# Patient Record
Sex: MALE | Race: WHITE | NOT HISPANIC OR LATINO | Employment: FULL TIME | URBAN - METROPOLITAN AREA
[De-identification: names, ages, dates, MRNs, and addresses within clinical notes are randomized per-mention and may not be internally consistent; named-entity substitution may affect disease eponyms.]

---

## 2018-01-18 ENCOUNTER — HOSPITAL ENCOUNTER (EMERGENCY)
Facility: OTHER | Age: 28
Discharge: HOME | End: 2018-01-18
Attending: EMERGENCY MEDICINE
Payer: MEDICAID

## 2018-01-18 VITALS
TEMPERATURE: 99 F | HEIGHT: 67 IN | OXYGEN SATURATION: 97 % | WEIGHT: 180 LBS | SYSTOLIC BLOOD PRESSURE: 152 MMHG | BODY MASS INDEX: 28.25 KG/M2 | RESPIRATION RATE: 19 BRPM | HEART RATE: 89 BPM | DIASTOLIC BLOOD PRESSURE: 72 MMHG

## 2018-01-18 DIAGNOSIS — B34.9 VIRAL SYNDROME: Primary | ICD-10-CM

## 2018-01-18 LAB
FLUAV AG SPEC QL IA: NEGATIVE
FLUBV AG SPEC QL IA: NEGATIVE
SPECIMEN SOURCE: NORMAL

## 2018-01-18 PROCEDURE — 99283 EMERGENCY DEPT VISIT LOW MDM: CPT

## 2018-01-18 PROCEDURE — 87400 INFLUENZA A/B EACH AG IA: CPT

## 2018-01-19 NOTE — ED PROVIDER NOTES
"Encounter Date: 1/18/2018    SCRIBE #1 NOTE: I, Alejapratima Rivers, am scribing for, and in the presence of, Dr. Chino.       History     Chief Complaint   Patient presents with    Generalized Body Aches     Pt c/o "not feeling well" since Tuesday, sore throat, headache, fever chills, and some diarrhea. Pt taking OTC meds with no relief      Time seen by provider: 9:29 PM    This is a 27 y.o. male who presents with complaint of multiple flu-like symptoms which started two days ago. The patient reports generalized body aches, headache, subjective fever, chills, decreased appetite, and diarrhea. Symptoms have been unchanged by OTC cold medications. He denies nausea, vomiting, abdominal pain, constipation, ear pain, sore throat, chest pain, shortness of breath, light-headedness, dizziness, or visual disturbance. Patient reports mother as recent sick contact with flu. He denies having any pertinent medical problems. The patient has no additional complaints at this time.      The history is provided by the patient.     Review of patient's allergies indicates:  No Known Allergies  History reviewed. No pertinent past medical history.  History reviewed. No pertinent surgical history.  History reviewed. No pertinent family history.  Social History   Substance Use Topics    Smoking status: Never Smoker    Smokeless tobacco: Never Used    Alcohol use Yes     Review of Systems   Constitutional: Positive for appetite change (decreased), chills and fever (subjective).   HENT: Negative for ear pain and sore throat.    Eyes: Negative for visual disturbance.   Respiratory: Negative for shortness of breath.    Cardiovascular: Negative for chest pain.   Gastrointestinal: Positive for diarrhea. Negative for abdominal pain, constipation, nausea and vomiting.   Musculoskeletal: Positive for myalgias.   Neurological: Positive for headaches. Negative for dizziness and light-headedness.       Physical Exam     Initial Vitals [01/18/18 2059] "   BP Pulse Resp Temp SpO2   (!) 152/72 89 19 98.8 °F (37.1 °C) 97 %      MAP       98.67         Physical Exam    Nursing note and vitals reviewed.  Constitutional: He appears well-developed and well-nourished. He is not diaphoretic. No distress.   HENT:   Head: Normocephalic and atraumatic.   Right Ear: External ear normal.   Left Ear: External ear normal.   Bilateral TMs with no erythema or effusion. Bilateral tonsils have edema with no exudates. No uvula deviation.   Eyes: EOM are normal. Pupils are equal, round, and reactive to light. No scleral icterus.   Neck: Normal range of motion. Neck supple.   No cervical lymphadenopathy.   Cardiovascular: Normal rate, regular rhythm and normal heart sounds. Exam reveals no gallop and no friction rub.    No murmur heard.  Pulmonary/Chest: Breath sounds normal. No respiratory distress. He has no wheezes. He has no rhonchi. He has no rales.   Abdominal: Soft. Bowel sounds are normal. He exhibits no distension. There is no tenderness. There is no rebound and no guarding.   Musculoskeletal: Normal range of motion. He exhibits no edema or tenderness.   Lymphadenopathy:     He has no cervical adenopathy.   Neurological: He is alert and oriented to person, place, and time.   Skin: Skin is warm and dry. Capillary refill takes less than 2 seconds. No rash and no abscess noted. No erythema. No pallor.   Psychiatric: He has a normal mood and affect. His behavior is normal. Judgment and thought content normal.         ED Course   Procedures  Labs Reviewed   INFLUENZA A AND B ANTIGEN             Medical Decision Making:   Clinical Tests:   Lab Tests: Ordered and Reviewed  ED Management:  Well-appearing patient presents with 2 days of a constellation of symptoms consistent with viral illness.  Influenza is negative.  No concerning vital signs.  Discussed symptomatic treatment.  Encouraged to stay hydrated.  Follow-up primary care return here if worse.    I did have an extensive talk  regarding signs to return for and need for follow up. Patient expressed understanding and will monitor symptoms closely and follow-up as needed.    LYLE Chino M.D.  01/18/2018  11:31 PM              Scribe Attestation:   Scribe #1: I performed the above scribed service and the documentation accurately describes the services I performed. I attest to the accuracy of the note.    Attending Attestation:           Physician Attestation for Scribe:  Physician Attestation Statement for Scribe #1: I, Dr. Chino, reviewed documentation, as scribed by Aleja Rivers in my presence, and it is both accurate and complete.                 ED Course      Clinical Impression:     1. Viral syndrome                               Mark Chino MD  01/18/18 7175

## 2023-06-21 ENCOUNTER — HOSPITAL ENCOUNTER (INPATIENT)
Facility: HOSPITAL | Age: 33
LOS: 1 days | Discharge: HOME | DRG: 439 | End: 2023-06-22
Attending: EMERGENCY MEDICINE | Admitting: FAMILY MEDICINE
Payer: MEDICAID

## 2023-06-21 DIAGNOSIS — K85.90 PANCREATITIS: ICD-10-CM

## 2023-06-21 DIAGNOSIS — R07.9 CHEST PAIN: ICD-10-CM

## 2023-06-21 DIAGNOSIS — R10.30 GROIN PAIN: ICD-10-CM

## 2023-06-21 DIAGNOSIS — N20.1 RIGHT URETERAL STONE: ICD-10-CM

## 2023-06-21 DIAGNOSIS — K85.90 ACUTE PANCREATITIS, UNSPECIFIED COMPLICATION STATUS, UNSPECIFIED PANCREATITIS TYPE: Primary | ICD-10-CM

## 2023-06-21 PROBLEM — N43.3 HYDROCELE IN ADULT: Status: ACTIVE | Noted: 2023-06-21

## 2023-06-21 PROBLEM — N20.0 KIDNEY STONE: Status: ACTIVE | Noted: 2023-06-21

## 2023-06-21 PROBLEM — R74.8 ELEVATED LIPASE: Status: ACTIVE | Noted: 2023-06-21

## 2023-06-21 LAB
ALBUMIN SERPL BCP-MCNC: 4.4 G/DL (ref 3.5–5.2)
ALP SERPL-CCNC: 60 U/L (ref 55–135)
ALT SERPL W/O P-5'-P-CCNC: 24 U/L (ref 10–44)
AMPHET+METHAMPHET UR QL: NEGATIVE
ANION GAP SERPL CALC-SCNC: 7 MMOL/L (ref 8–16)
AST SERPL-CCNC: 23 U/L (ref 10–40)
BACTERIA #/AREA URNS HPF: NEGATIVE /HPF
BARBITURATES UR QL SCN>200 NG/ML: NEGATIVE
BASOPHILS # BLD AUTO: 0.05 K/UL (ref 0–0.2)
BASOPHILS NFR BLD: 0.7 % (ref 0–1.9)
BENZODIAZ UR QL SCN>200 NG/ML: NEGATIVE
BILIRUB SERPL-MCNC: 0.7 MG/DL (ref 0.1–1)
BILIRUB UR QL STRIP: NEGATIVE
BUN SERPL-MCNC: 10 MG/DL (ref 6–20)
BZE UR QL SCN: NEGATIVE
CALCIUM SERPL-MCNC: 9.2 MG/DL (ref 8.7–10.5)
CANNABINOIDS UR QL SCN: ABNORMAL
CHLORIDE SERPL-SCNC: 106 MMOL/L (ref 95–110)
CK SERPL-CCNC: 367 U/L (ref 20–200)
CLARITY UR: CLEAR
CO2 SERPL-SCNC: 25 MMOL/L (ref 23–29)
COLOR UR: YELLOW
CREAT SERPL-MCNC: 0.9 MG/DL (ref 0.5–1.4)
CREAT UR-MCNC: 162 MG/DL (ref 23–375)
DIFFERENTIAL METHOD: ABNORMAL
EOSINOPHIL # BLD AUTO: 0 K/UL (ref 0–0.5)
EOSINOPHIL NFR BLD: 0.3 % (ref 0–8)
ERYTHROCYTE [DISTWIDTH] IN BLOOD BY AUTOMATED COUNT: 13.2 % (ref 11.5–14.5)
EST. GFR  (NO RACE VARIABLE): >60 ML/MIN/1.73 M^2
GLUCOSE SERPL-MCNC: 95 MG/DL (ref 70–110)
GLUCOSE UR QL STRIP: NEGATIVE
HCT VFR BLD AUTO: 43.7 % (ref 40–54)
HGB BLD-MCNC: 14.3 G/DL (ref 14–18)
HGB UR QL STRIP: ABNORMAL
HYALINE CASTS #/AREA URNS LPF: 1 /LPF
IMM GRANULOCYTES # BLD AUTO: 0.02 K/UL (ref 0–0.04)
IMM GRANULOCYTES NFR BLD AUTO: 0.3 % (ref 0–0.5)
KETONES UR QL STRIP: NEGATIVE
LEUKOCYTE ESTERASE UR QL STRIP: NEGATIVE
LIPASE SERPL-CCNC: 151 U/L (ref 4–60)
LIPASE SERPL-CCNC: 360 U/L (ref 4–60)
LYMPHOCYTES # BLD AUTO: 1 K/UL (ref 1–4.8)
LYMPHOCYTES NFR BLD: 14 % (ref 18–48)
MAGNESIUM SERPL-MCNC: 1.8 MG/DL (ref 1.6–2.6)
MCH RBC QN AUTO: 28.5 PG (ref 27–31)
MCHC RBC AUTO-ENTMCNC: 32.7 G/DL (ref 32–36)
MCV RBC AUTO: 87 FL (ref 82–98)
MICROSCOPIC COMMENT: ABNORMAL
MONOCYTES # BLD AUTO: 0.5 K/UL (ref 0.3–1)
MONOCYTES NFR BLD: 7 % (ref 4–15)
NEUTROPHILS # BLD AUTO: 5.4 K/UL (ref 1.8–7.7)
NEUTROPHILS NFR BLD: 77.7 % (ref 38–73)
NITRITE UR QL STRIP: NEGATIVE
NRBC BLD-RTO: 0 /100 WBC
OPIATES UR QL SCN: ABNORMAL
PCP UR QL SCN>25 NG/ML: NEGATIVE
PH UR STRIP: 7 [PH] (ref 5–8)
PLATELET # BLD AUTO: 195 K/UL (ref 150–450)
PMV BLD AUTO: 11.2 FL (ref 9.2–12.9)
POTASSIUM SERPL-SCNC: 3.7 MMOL/L (ref 3.5–5.1)
PROT SERPL-MCNC: 7.7 G/DL (ref 6–8.4)
PROT UR QL STRIP: NEGATIVE
RBC # BLD AUTO: 5.01 M/UL (ref 4.6–6.2)
RBC #/AREA URNS HPF: 16 /HPF (ref 0–4)
SODIUM SERPL-SCNC: 138 MMOL/L (ref 136–145)
SP GR UR STRIP: 1.01 (ref 1–1.03)
SQUAMOUS #/AREA URNS HPF: 1 /HPF
TOXICOLOGY INFORMATION: ABNORMAL
TSH SERPL DL<=0.005 MIU/L-ACNC: 0.84 UIU/ML (ref 0.34–5.6)
URN SPEC COLLECT METH UR: ABNORMAL
UROBILINOGEN UR STRIP-ACNC: NEGATIVE EU/DL
WBC # BLD AUTO: 6.99 K/UL (ref 3.9–12.7)
WBC #/AREA URNS HPF: 0 /HPF (ref 0–5)

## 2023-06-21 PROCEDURE — 51798 US URINE CAPACITY MEASURE: CPT

## 2023-06-21 PROCEDURE — 82550 ASSAY OF CK (CPK): CPT | Performed by: EMERGENCY MEDICINE

## 2023-06-21 PROCEDURE — 25000003 PHARM REV CODE 250: Performed by: NURSE PRACTITIONER

## 2023-06-21 PROCEDURE — 96375 TX/PRO/DX INJ NEW DRUG ADDON: CPT

## 2023-06-21 PROCEDURE — 96374 THER/PROPH/DIAG INJ IV PUSH: CPT

## 2023-06-21 PROCEDURE — 81001 URINALYSIS AUTO W/SCOPE: CPT | Performed by: EMERGENCY MEDICINE

## 2023-06-21 PROCEDURE — C9113 INJ PANTOPRAZOLE SODIUM, VIA: HCPCS | Performed by: EMERGENCY MEDICINE

## 2023-06-21 PROCEDURE — 99285 EMERGENCY DEPT VISIT HI MDM: CPT | Mod: 25

## 2023-06-21 PROCEDURE — 84443 ASSAY THYROID STIM HORMONE: CPT | Performed by: EMERGENCY MEDICINE

## 2023-06-21 PROCEDURE — 12000002 HC ACUTE/MED SURGE SEMI-PRIVATE ROOM

## 2023-06-21 PROCEDURE — 80053 COMPREHEN METABOLIC PANEL: CPT | Performed by: EMERGENCY MEDICINE

## 2023-06-21 PROCEDURE — 25000003 PHARM REV CODE 250: Performed by: EMERGENCY MEDICINE

## 2023-06-21 PROCEDURE — 83690 ASSAY OF LIPASE: CPT | Mod: 91 | Performed by: EMERGENCY MEDICINE

## 2023-06-21 PROCEDURE — 96361 HYDRATE IV INFUSION ADD-ON: CPT

## 2023-06-21 PROCEDURE — 83735 ASSAY OF MAGNESIUM: CPT | Performed by: EMERGENCY MEDICINE

## 2023-06-21 PROCEDURE — 85025 COMPLETE CBC W/AUTO DIFF WBC: CPT | Performed by: EMERGENCY MEDICINE

## 2023-06-21 PROCEDURE — 80307 DRUG TEST PRSMV CHEM ANLYZR: CPT | Performed by: EMERGENCY MEDICINE

## 2023-06-21 PROCEDURE — 63600175 PHARM REV CODE 636 W HCPCS: Performed by: EMERGENCY MEDICINE

## 2023-06-21 RX ORDER — PANTOPRAZOLE SODIUM 40 MG/10ML
40 INJECTION, POWDER, LYOPHILIZED, FOR SOLUTION INTRAVENOUS DAILY
Status: DISCONTINUED | OUTPATIENT
Start: 2023-06-22 | End: 2023-06-21

## 2023-06-21 RX ORDER — PROCHLORPERAZINE EDISYLATE 5 MG/ML
5 INJECTION INTRAMUSCULAR; INTRAVENOUS EVERY 6 HOURS PRN
Status: DISCONTINUED | OUTPATIENT
Start: 2023-06-21 | End: 2023-06-22 | Stop reason: HOSPADM

## 2023-06-21 RX ORDER — SIMETHICONE 80 MG
1 TABLET,CHEWABLE ORAL 4 TIMES DAILY PRN
Status: DISCONTINUED | OUTPATIENT
Start: 2023-06-21 | End: 2023-06-22 | Stop reason: HOSPADM

## 2023-06-21 RX ORDER — TALC
6 POWDER (GRAM) TOPICAL NIGHTLY PRN
Status: DISCONTINUED | OUTPATIENT
Start: 2023-06-21 | End: 2023-06-22 | Stop reason: HOSPADM

## 2023-06-21 RX ORDER — POLYETHYLENE GLYCOL 3350 17 G/17G
17 POWDER, FOR SOLUTION ORAL 2 TIMES DAILY PRN
Status: DISCONTINUED | OUTPATIENT
Start: 2023-06-21 | End: 2023-06-22 | Stop reason: HOSPADM

## 2023-06-21 RX ORDER — MAGNESIUM SULFATE HEPTAHYDRATE 40 MG/ML
2 INJECTION, SOLUTION INTRAVENOUS
Status: DISCONTINUED | OUTPATIENT
Start: 2023-06-21 | End: 2023-06-22 | Stop reason: HOSPADM

## 2023-06-21 RX ORDER — NALOXONE HCL 0.4 MG/ML
0.02 VIAL (ML) INJECTION
Status: DISCONTINUED | OUTPATIENT
Start: 2023-06-21 | End: 2023-06-22 | Stop reason: HOSPADM

## 2023-06-21 RX ORDER — MORPHINE SULFATE 2 MG/ML
2 INJECTION, SOLUTION INTRAMUSCULAR; INTRAVENOUS
Status: COMPLETED | OUTPATIENT
Start: 2023-06-21 | End: 2023-06-21

## 2023-06-21 RX ORDER — KETOROLAC TROMETHAMINE 30 MG/ML
15 INJECTION, SOLUTION INTRAMUSCULAR; INTRAVENOUS
Status: COMPLETED | OUTPATIENT
Start: 2023-06-21 | End: 2023-06-21

## 2023-06-21 RX ORDER — ACETAMINOPHEN 325 MG/1
650 TABLET ORAL EVERY 4 HOURS PRN
Status: DISCONTINUED | OUTPATIENT
Start: 2023-06-21 | End: 2023-06-22 | Stop reason: HOSPADM

## 2023-06-21 RX ORDER — SODIUM CHLORIDE 0.9 % (FLUSH) 0.9 %
10 SYRINGE (ML) INJECTION
Status: DISCONTINUED | OUTPATIENT
Start: 2023-06-21 | End: 2023-06-22 | Stop reason: HOSPADM

## 2023-06-21 RX ORDER — PANTOPRAZOLE SODIUM 40 MG/10ML
40 INJECTION, POWDER, LYOPHILIZED, FOR SOLUTION INTRAVENOUS ONCE
Status: COMPLETED | OUTPATIENT
Start: 2023-06-21 | End: 2023-06-21

## 2023-06-21 RX ORDER — ONDANSETRON 2 MG/ML
4 INJECTION INTRAMUSCULAR; INTRAVENOUS EVERY 6 HOURS PRN
Status: DISCONTINUED | OUTPATIENT
Start: 2023-06-22 | End: 2023-06-22 | Stop reason: HOSPADM

## 2023-06-21 RX ORDER — MORPHINE SULFATE 4 MG/ML
4 INJECTION, SOLUTION INTRAMUSCULAR; INTRAVENOUS EVERY 4 HOURS PRN
Status: DISCONTINUED | OUTPATIENT
Start: 2023-06-22 | End: 2023-06-22 | Stop reason: HOSPADM

## 2023-06-21 RX ORDER — MORPHINE SULFATE 2 MG/ML
2 INJECTION, SOLUTION INTRAMUSCULAR; INTRAVENOUS EVERY 4 HOURS PRN
Status: DISCONTINUED | OUTPATIENT
Start: 2023-06-22 | End: 2023-06-22 | Stop reason: HOSPADM

## 2023-06-21 RX ORDER — TAMSULOSIN HYDROCHLORIDE 0.4 MG/1
0.4 CAPSULE ORAL DAILY
Status: DISCONTINUED | OUTPATIENT
Start: 2023-06-22 | End: 2023-06-22 | Stop reason: HOSPADM

## 2023-06-21 RX ORDER — SODIUM CHLORIDE 9 MG/ML
INJECTION, SOLUTION INTRAVENOUS CONTINUOUS
Status: DISCONTINUED | OUTPATIENT
Start: 2023-06-21 | End: 2023-06-22 | Stop reason: HOSPADM

## 2023-06-21 RX ORDER — ONDANSETRON 2 MG/ML
4 INJECTION INTRAMUSCULAR; INTRAVENOUS
Status: COMPLETED | OUTPATIENT
Start: 2023-06-21 | End: 2023-06-21

## 2023-06-21 RX ORDER — MAGNESIUM SULFATE HEPTAHYDRATE 40 MG/ML
4 INJECTION, SOLUTION INTRAVENOUS
Status: DISCONTINUED | OUTPATIENT
Start: 2023-06-21 | End: 2023-06-22 | Stop reason: HOSPADM

## 2023-06-21 RX ORDER — TAMSULOSIN HYDROCHLORIDE 0.4 MG/1
0.4 CAPSULE ORAL
Status: COMPLETED | OUTPATIENT
Start: 2023-06-21 | End: 2023-06-21

## 2023-06-21 RX ADMIN — SODIUM CHLORIDE: 0.9 INJECTION, SOLUTION INTRAVENOUS at 11:06

## 2023-06-21 RX ADMIN — SODIUM CHLORIDE, SODIUM LACTATE, POTASSIUM CHLORIDE, AND CALCIUM CHLORIDE 1000 ML: .6; .31; .03; .02 INJECTION, SOLUTION INTRAVENOUS at 08:06

## 2023-06-21 RX ADMIN — KETOROLAC TROMETHAMINE 15 MG: 30 INJECTION, SOLUTION INTRAMUSCULAR; INTRAVENOUS at 06:06

## 2023-06-21 RX ADMIN — SODIUM CHLORIDE 1000 ML: 0.9 INJECTION, SOLUTION INTRAVENOUS at 06:06

## 2023-06-21 RX ADMIN — TAMSULOSIN HYDROCHLORIDE 0.4 MG: 0.4 CAPSULE ORAL at 06:06

## 2023-06-21 RX ADMIN — MORPHINE SULFATE 2 MG: 2 INJECTION, SOLUTION INTRAMUSCULAR; INTRAVENOUS at 09:06

## 2023-06-21 RX ADMIN — PANTOPRAZOLE SODIUM 40 MG: 40 INJECTION, POWDER, LYOPHILIZED, FOR SOLUTION INTRAVENOUS at 08:06

## 2023-06-21 RX ADMIN — ONDANSETRON 4 MG: 2 INJECTION INTRAMUSCULAR; INTRAVENOUS at 09:06

## 2023-06-21 NOTE — FIRST PROVIDER EVALUATION
"Medical screening examination initiated.  I have conducted a focused provider triage encounter, findings are as follows:    Brief history of present illness:  Patient to the ED via EMS.  Complaining of right flank pain.  Patient states it wraps around his right lower quadrant into his right groin.  Denies fever nausea vomiting or diarrhea.  Patient's pain is controlled.      Vitals:    06/21/23 1327   BP: (!) 140/65   BP Location: Right arm   Patient Position: Sitting   Pulse: 60   Resp: 16   Temp: 98 °F (36.7 °C)   TempSrc: Oral   SpO2: 98%   Weight: 77.1 kg (170 lb)   Height: 5' 7" (1.702 m)       Pertinent physical exam:  Right CVA tenderness.  Negative swelling to the groin region.    Brief workup plan:  Labs.    Preliminary workup initiated; this workup will be continued and followed by the physician or advanced practice provider that is assigned to the patient when roomed.  "

## 2023-06-21 NOTE — ED PROVIDER NOTES
"Encounter Date: 6/21/2023       History     Chief Complaint   Patient presents with    Abdominal Pain     Right lower abdominal pain radiating to his back and down into his groin. Sudden onset no history. EMS gave 4mg of zofran and 100mcg of fentynal.      This is a 33-year-old male presenting with a sudden onset of right flank pain radiating around his right lower abdomen right groin area.  Denies any testicular pain to states the pain radiates into the "groin" region.  He is had associated urinary urgency and frequency since the pain started.  Denies any history of similar symptoms.  Denies any pain or discomfort prior to the onset of the symptoms today.  No history of kidney stones.  Denies any penile discharge, fever the patient was given fentanyl and Zofran prior to ED arrival by EMS with significant improvement in symptoms.  Denies any other problems or complaints.      Review of patient's allergies indicates:  No Known Allergies  No past medical history on file.  No past surgical history on file.  No family history on file.  Social History     Tobacco Use    Smoking status: Never    Smokeless tobacco: Never   Substance Use Topics    Alcohol use: Yes    Drug use: No     Review of Systems   Constitutional: Negative.  Negative for activity change, appetite change, chills, diaphoresis, fatigue, fever and unexpected weight change.   HENT: Negative.  Negative for congestion, dental problem, ear pain, nosebleeds, rhinorrhea, sinus pain, sore throat, trouble swallowing and voice change.    Eyes: Negative.  Negative for pain and visual disturbance.   Respiratory: Negative.  Negative for cough, chest tightness, shortness of breath and wheezing.    Cardiovascular: Negative.  Negative for chest pain, palpitations and leg swelling.   Gastrointestinal: Negative.  Negative for abdominal pain, blood in stool, constipation, diarrhea, nausea and vomiting.   Endocrine: Negative.    Genitourinary:  Positive for flank pain and " frequency. Negative for decreased urine volume, difficulty urinating, dysuria, genital sores, hematuria, penile discharge, penile pain, penile swelling, scrotal swelling, testicular pain and urgency.   Musculoskeletal:  Negative for arthralgias, back pain, gait problem, joint swelling, myalgias, neck pain and neck stiffness.   Skin: Negative.  Negative for pallor and rash.   Neurological: Negative.  Negative for dizziness, seizures, syncope, facial asymmetry, speech difficulty, weakness, light-headedness, numbness and headaches.   Hematological: Negative.  Does not bruise/bleed easily.   Psychiatric/Behavioral: Negative.  Negative for confusion.    All other systems reviewed and are negative.    Physical Exam     Initial Vitals [06/21/23 1327]   BP Pulse Resp Temp SpO2   (!) 140/65 60 16 98 °F (36.7 °C) 98 %      MAP       --         Physical Exam    Nursing note and vitals reviewed.  Constitutional: He appears well-nourished. He is active and cooperative.  Non-toxic appearance. He does not have a sickly appearance. He does not appear ill. No distress.   HENT:   Head: Normocephalic and atraumatic.   Right Ear: Tympanic membrane normal.   Left Ear: Tympanic membrane normal.   Nose: Nose normal. No mucosal edema or rhinorrhea.   Mouth/Throat: Uvula is midline, oropharynx is clear and moist and mucous membranes are normal. No oral lesions. No trismus in the jaw. No uvula swelling. No oropharyngeal exudate, posterior oropharyngeal edema, posterior oropharyngeal erythema or tonsillar abscesses.   Eyes: Conjunctivae, EOM and lids are normal. Pupils are equal, round, and reactive to light. Right eye exhibits no discharge. Left eye exhibits no discharge. No scleral icterus.   Neck: Trachea normal and phonation normal. Neck supple. No thyromegaly present. No stridor present. No tracheal deviation present. No JVD present.   Normal range of motion.   Full passive range of motion without pain.     Cardiovascular:  Normal rate,  regular rhythm, normal heart sounds, intact distal pulses and normal pulses.     Exam reveals no gallop, no distant heart sounds, no friction rub and no decreased pulses.       No murmur heard.  Pulmonary/Chest: Effort normal and breath sounds normal. No stridor. No respiratory distress. He has no decreased breath sounds. He has no wheezes. He has no rhonchi. He has no rales.   Abdominal: Abdomen is soft. Bowel sounds are normal. He exhibits no distension, no pulsatile midline mass and no mass. There is no abdominal tenderness. No hernia. There is no rebound and no guarding.   Musculoskeletal:         General: No tenderness or edema. Normal range of motion.      Right hand: Normal. No tenderness or bony tenderness. Normal range of motion. Normal strength. Normal sensation. Normal capillary refill. Normal pulse.      Left hand: Normal. No tenderness or bony tenderness. Normal range of motion. Normal strength. Normal sensation. Normal capillary refill. Normal pulse.      Cervical back: Normal, full passive range of motion without pain, normal range of motion and neck supple. No swelling, edema, erythema, rigidity, tenderness or bony tenderness. No pain with movement, spinous process tenderness or muscular tenderness. Normal range of motion and normal range of motion. Normal.      Thoracic back: Normal. No swelling, tenderness or bony tenderness. Normal range of motion.      Lumbar back: Normal. No swelling, edema, tenderness or bony tenderness. Normal range of motion.      Right foot: Normal. Normal range of motion and normal capillary refill. No swelling, tenderness or bony tenderness. Normal pulse.      Left foot: Normal. Normal range of motion and normal capillary refill. No swelling, tenderness or bony tenderness. Normal pulse.      Comments: Pulses are 2+ throughout, cap refill is less than 2 sec throughout, no edema noted, extremities are nontender throughout with full range of motion     Lymphadenopathy:     He  has no cervical adenopathy.   Neurological: He is alert and oriented to person, place, and time. He has normal strength. No cranial nerve deficit or sensory deficit. Coordination and gait normal. GCS score is 15. GCS eye subscore is 4. GCS verbal subscore is 5. GCS motor subscore is 6.   No focal deficits   Skin: Skin is warm and dry. Capillary refill takes less than 2 seconds. No ecchymosis, no petechiae and no rash noted. No cyanosis or erythema. No pallor.   Psychiatric: He has a normal mood and affect. His speech is normal and behavior is normal. Judgment and thought content normal. Cognition and memory are normal.       ED Course   Procedures  Labs Reviewed   CBC W/ AUTO DIFFERENTIAL - Abnormal; Notable for the following components:       Result Value    Gran % 77.7 (*)     Lymph % 14.0 (*)     All other components within normal limits   COMPREHENSIVE METABOLIC PANEL - Abnormal; Notable for the following components:    Anion Gap 7 (*)     All other components within normal limits   LIPASE - Abnormal; Notable for the following components:    Lipase 151 (*)     All other components within normal limits   CK - Abnormal; Notable for the following components:     (*)     All other components within normal limits   MAGNESIUM   TSH   URINALYSIS, REFLEX TO URINE CULTURE   DRUG SCREEN PANEL, URINE EMERGENCY          Imaging Results              CT Renal Stone Study ABD Pelvis WO (Final result)  Result time 06/21/23 14:57:45      Final result by Rabia Gaston MD (06/21/23 14:57:45)                   Narrative:    CMS MANDATED QUALITY DATA - CT RADIATION - 436    All CT scans at this facility utilize dose modulation, iterative reconstruction, and/or weight based dosing when appropriate to reduce radiation dose to as low as reasonably achievable.    HISTORY: Flank pain    FINDINGS: Noncontrast axial images were obtained. Nonenhanced study is tailored for the detection of urolithiasis, and is insensitive for  abnormalities of the solid organs, vasculature and hollow viscera.    CT ABDOMEN: The lung bases are clear.    The liver, spleen, pancreas have a normal noncontrast appearance.  Gallbladder and adrenal glands are normal  The kidneys are symmetric in size. There is mild right hydroureteronephrosis secondary to a faint 2 mm right ureterovesical junction stone.  The left kidney is normal in size without hydronephrosis. There are no renal stones.    There are no thick-walled or dilated bowel loops.  There is no adenopathy. The aorta is normal in caliber. The musculature is normal.      CT PELVIS: The bladder and prostate gland are normal. There is no pelvic adenopathy. There are no acute osseous abnormalities.    IMPRESSION: Faint 2 mm right ureterovesical junction stone resulting in mild dilatation of the right ureter and collecting system    Electronically signed by:  Rabia Gaston MD  6/21/2023 2:57 PM CDT Workstation: [id number]                                     US Scrotum And Testicles (Final result)  Result time 06/21/23 14:23:42      Final result by Nemesio Vidal MD (06/21/23 14:23:42)                   Narrative:    REASON: Groin pain.    TECHNIQUE: Grayscale and color Doppler ultrasound of the scrotum and testicles.    COMPARISON: None.    FINDINGS:    Right testicle measures 3.7 x 1.9 x 2.9 cm. Left testicle measures 3.6 x 2.1 x 2.8 cm. Normal vascularity of the testicles demonstrated bilaterally. No testicular mass or lesion identified. There are small bilateral hydroceles noted, more so on the left and the right. Left epididymal head cyst versus spermatocele noted measuring 6 mm. Right epididymis is normal. No inguinal hernia identified.    IMPRESSION:    Small bilateral hydroceles, left greater than right.  6 mm left epididymal head cyst.    Electronically signed by:  Nemesio Vidal DO  6/21/2023 2:23 PM CDT Workstation: [id number]                                     Medications   tamsulosin 24 hr  capsule 0.4 mg (has no administration in time range)   ketorolac injection 15 mg (has no administration in time range)   sodium chloride 0.9% bolus 1,000 mL 1,000 mL (has no administration in time range)   pantoprazole injection 40 mg (has no administration in time range)     Medical Decision Making:   Clinical Tests:   Lab Tests: Ordered and Reviewed  Radiological Study: Ordered and Reviewed  Medical Tests: Ordered and Reviewed  ED Management:  Emergent evaluation for 33-year-old male presenting for right flank pain radiating around the right lower abdomen.  Symptoms sudden onset.  No fever.  No vomiting but had nausea.  Lipase noted to be elevated.  The patient does report that he drank a significant amount of alcohol on the weekend and does drink heavily on the weekends.  Was not however having any abdominal pain until today.  CT scan with evidence of a ureteral stone.  Patient with both evidence of ureteral stone as well as acute pancreatitis.  IV fluids given.  Protonix given.  Had initial improvement after fentanyl with EMS.  Pain reoccurred and Toradol administered, pain reoccurred and will give morphine.  Will discuss with hospitalist for admission.                        Clinical Impression:   Final diagnoses:  [R10.30] Groin pain               Tiffany Marsh MD  06/21/23 6896

## 2023-06-22 VITALS
TEMPERATURE: 98 F | OXYGEN SATURATION: 96 % | DIASTOLIC BLOOD PRESSURE: 81 MMHG | WEIGHT: 209.38 LBS | HEIGHT: 68 IN | BODY MASS INDEX: 31.73 KG/M2 | SYSTOLIC BLOOD PRESSURE: 132 MMHG | RESPIRATION RATE: 18 BRPM | HEART RATE: 64 BPM

## 2023-06-22 LAB
ALBUMIN SERPL BCP-MCNC: 3.6 G/DL (ref 3.5–5.2)
ALP SERPL-CCNC: 51 U/L (ref 55–135)
ALT SERPL W/O P-5'-P-CCNC: 19 U/L (ref 10–44)
ANION GAP SERPL CALC-SCNC: 5 MMOL/L (ref 8–16)
AST SERPL-CCNC: 17 U/L (ref 10–40)
BASOPHILS # BLD AUTO: 0.05 K/UL (ref 0–0.2)
BASOPHILS NFR BLD: 0.9 % (ref 0–1.9)
BILIRUB SERPL-MCNC: 0.6 MG/DL (ref 0.1–1)
BUN SERPL-MCNC: 10 MG/DL (ref 6–20)
CALCIUM SERPL-MCNC: 8.6 MG/DL (ref 8.7–10.5)
CHLORIDE SERPL-SCNC: 109 MMOL/L (ref 95–110)
CO2 SERPL-SCNC: 25 MMOL/L (ref 23–29)
CREAT SERPL-MCNC: 0.9 MG/DL (ref 0.5–1.4)
DIFFERENTIAL METHOD: ABNORMAL
EOSINOPHIL # BLD AUTO: 0.1 K/UL (ref 0–0.5)
EOSINOPHIL NFR BLD: 1.3 % (ref 0–8)
ERYTHROCYTE [DISTWIDTH] IN BLOOD BY AUTOMATED COUNT: 13.3 % (ref 11.5–14.5)
EST. GFR  (NO RACE VARIABLE): >60 ML/MIN/1.73 M^2
GLUCOSE SERPL-MCNC: 97 MG/DL (ref 70–110)
HCT VFR BLD AUTO: 41.6 % (ref 40–54)
HGB BLD-MCNC: 13.2 G/DL (ref 14–18)
IMM GRANULOCYTES # BLD AUTO: 0.02 K/UL (ref 0–0.04)
IMM GRANULOCYTES NFR BLD AUTO: 0.4 % (ref 0–0.5)
LYMPHOCYTES # BLD AUTO: 2.1 K/UL (ref 1–4.8)
LYMPHOCYTES NFR BLD: 40.2 % (ref 18–48)
MCH RBC QN AUTO: 27.6 PG (ref 27–31)
MCHC RBC AUTO-ENTMCNC: 31.7 G/DL (ref 32–36)
MCV RBC AUTO: 87 FL (ref 82–98)
MONOCYTES # BLD AUTO: 0.6 K/UL (ref 0.3–1)
MONOCYTES NFR BLD: 11.1 % (ref 4–15)
NEUTROPHILS # BLD AUTO: 2.5 K/UL (ref 1.8–7.7)
NEUTROPHILS NFR BLD: 46.1 % (ref 38–73)
NRBC BLD-RTO: 0 /100 WBC
PLATELET # BLD AUTO: 173 K/UL (ref 150–450)
PMV BLD AUTO: 11.3 FL (ref 9.2–12.9)
POTASSIUM SERPL-SCNC: 3.6 MMOL/L (ref 3.5–5.1)
PROT SERPL-MCNC: 6.3 G/DL (ref 6–8.4)
RBC # BLD AUTO: 4.78 M/UL (ref 4.6–6.2)
SODIUM SERPL-SCNC: 139 MMOL/L (ref 136–145)
WBC # BLD AUTO: 5.33 K/UL (ref 3.9–12.7)

## 2023-06-22 PROCEDURE — 85025 COMPLETE CBC W/AUTO DIFF WBC: CPT | Performed by: NURSE PRACTITIONER

## 2023-06-22 PROCEDURE — 94761 N-INVAS EAR/PLS OXIMETRY MLT: CPT

## 2023-06-22 PROCEDURE — 80053 COMPREHEN METABOLIC PANEL: CPT | Performed by: NURSE PRACTITIONER

## 2023-06-22 PROCEDURE — 25000003 PHARM REV CODE 250: Performed by: NURSE PRACTITIONER

## 2023-06-22 PROCEDURE — 63600175 PHARM REV CODE 636 W HCPCS: Performed by: NURSE PRACTITIONER

## 2023-06-22 PROCEDURE — 36415 COLL VENOUS BLD VENIPUNCTURE: CPT | Performed by: NURSE PRACTITIONER

## 2023-06-22 RX ADMIN — MORPHINE SULFATE 2 MG: 2 INJECTION, SOLUTION INTRAMUSCULAR; INTRAVENOUS at 09:06

## 2023-06-22 RX ADMIN — TAMSULOSIN HYDROCHLORIDE 0.4 MG: 0.4 CAPSULE ORAL at 09:06

## 2023-06-22 RX ADMIN — SODIUM CHLORIDE: 0.9 INJECTION, SOLUTION INTRAVENOUS at 09:06

## 2023-06-22 NOTE — PHARMACY MED REC
"      Admission Medication History     The home medication history was taken by Angelica Vasquez.    You may go to "Admission" then "Reconcile Home Medications" tabs to review and/or act upon these items.     The home medication list has been updated by the Pharmacy department.   Please read ALL comments highlighted in yellow.   Please address this information as you see fit.    Feel free to contact us if you have any questions or require assistance.            Medications listed below were obtained from: Patient/family and Analytic software- [employer]  No current facility-administered medications on file prior to encounter.     No current outpatient medications on file prior to encounter.             Angelica Vasquez  [id number]            .        "

## 2023-06-22 NOTE — PLAN OF CARE
06/22/23 1424   Final Note   Assessment Type Final Discharge Note   Anticipated Discharge Disposition Home   What phone number can be called within the next 1-3 days to see how you are doing after discharge? [phone]   Hospital Resources/Appts/Education Provided   (SW provided pt with [facility] in his neighborhood for follow up.)   Post-Acute Status   Post-Acute Authorization Other   Other Status No Post-Acute Service Needs   Discharge Delays None known at this time     Patient cleared for discharge from case management standpoint.    Chart and discharge orders reviewed.  Patient discharged home with no further case management needs.

## 2023-06-22 NOTE — NURSING
Discharge instructions reviewed with pt.  IV removed without difficulty, catheter intact. Pt left unit in stable conditions with personal belongings.

## 2023-06-22 NOTE — H&P
[facility] - Emergency Dept  Hospital Medicine  History & Physical    Patient Name: Roberto Rosales  MRN: [id number]  Patient Class: IP- Inpatient  Admission Date: 6/21/2023  Attending Physician: Della Hanson MD   Primary Care Provider: Primary Doctor No         Patient information was obtained from patient and ER records.     Subjective:     Principal Problem:Acute pancreatitis    Chief Complaint:   Chief Complaint   Patient presents with    Abdominal Pain     Right lower abdominal pain radiating to his back and down into his groin. Sudden onset no history. EMS gave 4mg of zofran and 100mcg of fentynal.         HPI: Roberto Rosales is a 33-year-old male with no chronic medical problems who presents to the emergency room complaining right flank pain.  Patient states this morning when he was on his way to work and stop and go to the bathroom a store.  Said when he was using the bathroom he got a sudden onset of severe right groin pain that radiated around to his abdomen and into his right flank.  Said the pain was very severe, made him nauseated.  He felt like he had to strain to void in his had decreased urination today.  He said he is only gone twice once this morning and then once while he was here in the emergency room.  He said he felt fine when he woke up this morning.  He had normal day yesterday with no symptoms.  He denies any fevers chills, vomiting, shortness of breath or coughing, lower extremity edema, constipation or diarrhea.  He said he drinks a lot on the weekend but does not drink every day.  He has no history of kidney stones, gallbladder problems or pancreatitis..  He denies severe abdominal pain and said most of the pain is in his right flank and groin.  He said pain and nausea are improved after pain and nausea medication and he said his girlfriend brought him some food and he ate this evening.    In the ED, Ultrasound of scrotum and testes with small bilateral hydroceles  left-greater-than-right, 6 mm left epididymal head cysts.  Faint 2 mm right ureterovesical junction stone resulting in mild dilatation of the right ureter and collecting system.  Liver spleen and pancreas have a normal noncontrast appearance.  Lab work with lipase elevated at 360, , H&H 14.3/43.7, WBC 6.99, platelets 195, BUN/CR 10/0.9, glucose 95, serum bicarb 25 sodium 138, potassium 3.7, magnesium 1.8, total bilirubin 0.7, ALT/AST 24/23, alk-phos 60, albumin 4.4 and calcium 9.2.  Urinalysis with 16 RBCs and 3+ blood, urine drug screen positive for opiates marijuana, in the ED he was given 50 mg IV ketorolac, a L of lactated Ringer's and a L of normal saline, 0.4 mg tamsulosin, 40 mg IV pantoprazole and doses of morphine and ondansetron.  Vital signs with temperature 98°, heart rate 64, blood pressure 127/81, respiratory rate 16 and O2 sat 97%.  He will be admitted to the hospitalist service for further evaluation and treatment.      PAST MEDICAL HISTORY:  None    PAST SURGICAL HISTORY:  Jaw surgery    FAMILY HISTORY:  No significant family history    SOCIAL HISTORY:  No cigarette smoking   Alcohol use on the weekend   No drug use     HOME MEDICATIONS:  No regular home medications    Scheduled Meds:  Continuous Infusions:   sodium chloride 0.9%       PRN Meds:.acetaminophen, magnesium sulfate IVPB, magnesium sulfate IVPB, melatonin, [START ON 6/22/2023] morphine, [START ON 6/22/2023] morphine, naloxone, [START ON 6/22/2023] ondansetron, polyethylene glycol, potassium bicarbonate, potassium bicarbonate, potassium bicarbonate, prochlorperazine, simethicone, sodium chloride 0.9%      Review of Systems   All other systems reviewed and are negative.  Twelve point review of systems obtained and negative except as stated above in HPI    Objective:     Vital Signs (Most Recent):  Temp: 98 °F (36.7 °C) (06/21/23 1327)  Pulse: 63 (06/21/23 2102)  Resp: 18 (06/21/23 2108)  BP: 120/77 (06/21/23 2102)  SpO2: 96 %  (06/21/23 2107) Vital Signs (24h Range):  Temp:  [98 °F (36.7 °C)] 98 °F (36.7 °C)  Pulse:  [49-83] 63  Resp:  [16-18] 18  SpO2:  [95 %-98 %] 96 %  BP: (110-140)/(58-83) 120/77     Weight: 77.1 kg (170 lb)  Body mass index is 26.63 kg/m².     Physical Exam  Vitals and nursing note reviewed.   Constitutional:       General: He is awake. He is not in acute distress.     Appearance: Normal appearance. He is not ill-appearing.      Comments: Healthy-appearing adult male, he is in no acute distress, good historian, his significant other is at bedside.   HENT:      Head: Normocephalic.      Right Ear: Hearing normal.      Left Ear: Hearing normal.      Nose: Nose normal.      Mouth/Throat:      Lips: Pink.      Mouth: Mucous membranes are moist.      Pharynx: Oropharynx is clear.   Eyes:      General: Lids are normal. Vision grossly intact.   Cardiovascular:      Rate and Rhythm: Normal rate and regular rhythm.      Pulses: Normal pulses.           Radial pulses are 2+ on the right side and 2+ on the left side.        Dorsalis pedis pulses are 2+ on the right side and 2+ on the left side.      Heart sounds: Normal heart sounds, S1 normal and S2 normal. No murmur heard.  Pulmonary:      Effort: Pulmonary effort is normal. No tachypnea or respiratory distress.      Breath sounds: Normal breath sounds. No decreased breath sounds, wheezing, rhonchi or rales.   Chest:      Chest wall: No tenderness.   Abdominal:      General: Abdomen is flat. Bowel sounds are normal. There is no distension.      Palpations: Abdomen is soft.      Tenderness: There is abdominal tenderness.      Comments: Mild right upper quadrant tenderness.  Otherwise abdomen is tender with bowel sounds.   Genitourinary:     Comments: Able to void clear yellow urine.  No CVA tenderness bilaterally.  Musculoskeletal:         General: No swelling or deformity. Normal range of motion.      Cervical back: Normal range of motion.      Right lower leg: No edema.       Left lower leg: No edema.      Comments: Independently ambulatory, moves all extremities with good equal strength.   Skin:     General: Skin is warm and dry.      Capillary Refill: Capillary refill takes less than 2 seconds.   Neurological:      General: No focal deficit present.      Mental Status: He is alert and oriented to person, place, and time.      GCS: GCS eye subscore is 4. GCS verbal subscore is 5. GCS motor subscore is 6.      Sensory: Sensation is intact.      Motor: Motor function is intact.      Coordination: Coordination is intact.   Psychiatric:         Attention and Perception: Attention and perception normal.         Mood and Affect: Mood and affect normal.         Speech: Speech normal.         Behavior: Behavior normal. Behavior is cooperative.         Thought Content: Thought content normal.         Cognition and Memory: Cognition and memory normal.         Judgment: Judgment normal.           Significant Labs: All pertinent labs within the past 24 hours have been reviewed.    Bilirubin:   Recent Labs   Lab 06/21/23  1446   BILITOT 0.7     CBC:   Recent Labs   Lab 06/21/23  1446   WBC 6.99   HGB 14.3   HCT 43.7        CMP:   Recent Labs   Lab 06/21/23  1446      K 3.7      CO2 25   GLU 95   BUN 10   CREATININE 0.9   CALCIUM 9.2   PROT 7.7   ALBUMIN 4.4   BILITOT 0.7   ALKPHOS 60   AST 23   ALT 24   ANIONGAP 7*     Magnesium:   Recent Labs   Lab 06/21/23  1446   MG 1.8     TSH:   Recent Labs   Lab 06/21/23  1446   TSH 0.840     Urine Studies:   Recent Labs   Lab 06/21/23  1819   COLORU Yellow   APPEARANCEUA Clear   PHUR 7.0   SPECGRAV 1.015   PROTEINUA Negative   GLUCUA Negative   KETONESU Negative   BILIRUBINUA Negative   OCCULTUA 3+*   NITRITE Negative   UROBILINOGEN Negative   LEUKOCYTESUR Negative   RBCUA 16*   WBCUA 0   BACTERIA Negative   SQUAMEPITHEL 1   HYALINECASTS 1       Significant Imaging: I have reviewed all pertinent imaging results/findings within the past 24  hours.    US Scrotum And Testicles [[id number]]Collected: 06/21/23 1334     FINDINGS:     Right testicle measures 3.7 x 1.9 x 2.9 cm. Left testicle measures 3.6 x 2.1 x 2.8 cm. Normal vascularity of the testicles demonstrated bilaterally. No testicular mass or lesion identified. There are small bilateral hydroceles noted, more so on the left and the right. Left epididymal head cyst versus spermatocele noted measuring 6 mm. Right epididymis is normal. No inguinal hernia identified.     IMPRESSION:     Small bilateral hydroceles, left greater than right.   6 mm left epididymal head cyst.     CT Renal Stone Study ABD Pelvis WO [[id number]]Collected: 06/21/23 1417     FINDINGS: Noncontrast axial images were obtained. Nonenhanced study is tailored for the detection of urolithiasis, and is insensitive for abnormalities of the solid organs, vasculature and hollow viscera.     CT ABDOMEN: The lung bases are clear.     The liver, spleen, pancreas have a normal noncontrast appearance.   Gallbladder and adrenal glands are normal   The kidneys are symmetric in size. There is mild right hydroureteronephrosis secondary to a faint 2 mm right ureterovesical junction stone.   The left kidney is normal in size without hydronephrosis. There are no renal stones.     There are no thick-walled or dilated bowel loops.   There is no adenopathy. The aorta is normal in caliber. The musculature is normal.       CT PELVIS: The bladder and prostate gland are normal. There is no pelvic adenopathy. There are no acute osseous abnormalities.     IMPRESSION: Faint 2 mm right ureterovesical junction stone resulting in mild dilatation of the right ureter and collecting system       Assessment/Plan:     * Acute pancreatitis  No CT evidence of pancreatitis but patient had initial lipase of 151 and a repeat lipase of 360, he has mild tenderness to right upper quadrant with palpation, no left upper quadrant tenderness, report of alcohol use on the weekends,  no history of pancreatitis, nausea much improved, we will continue IV fluids, morphine for pain and ondansetron for nausea, obtain right upper quadrant ultrasound      Hydrocele in adult  Small bilateral hydroceles left-greater-than-right and a 6 mm left epididymal head cyst      Kidney stone  Faint 2 mm right ureterovesical junction stone resulting in mild dilatation of the right ureter and collecting system, given tamsulosin in ED, continue fluid administration, strain urine, monitor I&O, pain improved with ketorolac and morphine        VTE Risk Mitigation (From admission, onward)         Ordered     IP VTE LOW RISK PATIENT  Once         06/21/23 2159     Place sequential compression device  Until discontinued         06/21/23 2159               Patient was examined at 2133    Code Status: FULL CODE        Henny Sampson NP  Department of Hospital Medicine  [facility] - Emergency Dept

## 2023-06-22 NOTE — ASSESSMENT & PLAN NOTE
Faint 2 mm right ureterovesical junction stone resulting in mild dilatation of the right ureter and collecting system, given tamsulosin in ED, continue fluid administration, strain urine, monitor I&O, pain improved with ketorolac and morphine

## 2023-06-22 NOTE — ASSESSMENT & PLAN NOTE
No CT evidence of pancreatitis but patient had initial lipase of 151 and a repeat lipase of 360, he has mild tenderness to right upper quadrant with palpation, no left upper quadrant tenderness, report of alcohol use on the weekends, no history of pancreatitis, nausea much improved, we will continue IV fluids, morphine for pain and ondansetron for nausea, obtain right upper quadrant ultrasound, clear liquid diet

## 2023-06-22 NOTE — CONSULTS
Provided patient with written and verbal pancreatitis MNT education along with contact information. Patient voiced understanding.

## 2023-06-22 NOTE — HPI
Roberto Rosales is a 33-year-old male with no chronic medical problems who presents to the emergency room complaining right flank pain.  Patient states this morning when he was on his way to work and stop and go to the bathroom a store.  Said when he was using the bathroom he got a sudden onset of severe right groin pain that radiated around to his abdomen and into his right flank.  Said the pain was very severe, made him nauseated.  He felt like he had to strain to void in his had decreased urination today.  He said he is only gone twice once this morning and then once while he was here in the emergency room.  He said he felt fine when he woke up this morning.  He had normal day yesterday with no symptoms.  He denies any fevers chills, vomiting, shortness of breath or coughing, lower extremity edema, constipation or diarrhea.  He said he drinks a lot on the weekend but does not drink every day.  He has no history of kidney stones, gallbladder problems or pancreatitis..  He denies severe abdominal pain and said most of the pain is in his right flank and groin.  He said pain and nausea are improved after pain and nausea medication and he said his girlfriend brought him some food and he ate this evening.    In the ED, Ultrasound of scrotum and testes with small bilateral hydroceles left-greater-than-right, 6 mm left epididymal head cysts.  Faint 2 mm right ureterovesical junction stone resulting in mild dilatation of the right ureter and collecting system.  Liver spleen and pancreas have a normal noncontrast appearance.  Lab work with lipase elevated at 360, , H&H 14.3/43.7, WBC 6.99, platelets 195, BUN/CR 10/0.9, glucose 95, serum bicarb 25 sodium 138, potassium 3.7, magnesium 1.8, total bilirubin 0.7, ALT/AST 24/23, alk-phos 60, albumin 4.4 and calcium 9.2.  Urinalysis with 16 RBCs and 3+ blood, urine drug screen positive for opiates marijuana, in the ED he was given 50 mg IV ketorolac, a L of lactated  Ringer's and a L of normal saline, 0.4 mg tamsulosin, 40 mg IV pantoprazole and doses of morphine and ondansetron.  Vital signs with temperature 98°, heart rate 64, blood pressure 127/81, respiratory rate 16 and O2 sat 97%.  He will be admitted to the hospitalist service for further evaluation and treatment.

## 2023-06-22 NOTE — DISCHARGE SUMMARY
[facility]  Discharge Summary      Patient Name: Roberto Rosales  MRN: [id number]  [facility]: [id number]  Patient Class: IP- Inpatient  Admission Date: 6/21/2023  Hospital Length of Stay: 1 days  Discharge Date and Time:  06/22/2023 1:19 PM  Attending Physician: Edward Martini MD   Discharging Provider: Edward Martini MD  Primary Care Provider: Primary Doctor No    Primary Care Team: Networked reference to record PCT     HPI:   Roberto Rosales is a 33-year-old male with no chronic medical problems who presents to the emergency room complaining right flank pain.  Patient states this morning when he was on his way to work and stop and go to the bathroom a store.  Said when he was using the bathroom he got a sudden onset of severe right groin pain that radiated around to his abdomen and into his right flank.  Said the pain was very severe, made him nauseated.  He felt like he had to strain to void in his had decreased urination today.  He said he is only gone twice once this morning and then once while he was here in the emergency room.  He said he felt fine when he woke up this morning.  He had normal day yesterday with no symptoms.  He denies any fevers chills, vomiting, shortness of breath or coughing, lower extremity edema, constipation or diarrhea.  He said he drinks a lot on the weekend but does not drink every day.  He has no history of kidney stones, gallbladder problems or pancreatitis..  He denies severe abdominal pain and said most of the pain is in his right flank and groin.  He said pain and nausea are improved after pain and nausea medication and he said his girlfriend brought him some food and he ate this evening.    In the ED, Ultrasound of scrotum and testes with small bilateral hydroceles left-greater-than-right, 6 mm left epididymal head cysts.  Faint 2 mm right ureterovesical junction stone resulting in mild dilatation of the right ureter and collecting system.  Liver  spleen and pancreas have a normal noncontrast appearance.  Lab work with lipase elevated at 360, , H&H 14.3/43.7, WBC 6.99, platelets 195, BUN/CR 10/0.9, glucose 95, serum bicarb 25 sodium 138, potassium 3.7, magnesium 1.8, total bilirubin 0.7, ALT/AST 24/23, alk-phos 60, albumin 4.4 and calcium 9.2.  Urinalysis with 16 RBCs and 3+ blood, urine drug screen positive for opiates marijuana, in the ED he was given 50 mg IV ketorolac, a L of lactated Ringer's and a L of normal saline, 0.4 mg tamsulosin, 40 mg IV pantoprazole and doses of morphine and ondansetron.  Vital signs with temperature 98°, heart rate 64, blood pressure 127/81, respiratory rate 16 and O2 sat 97%.  He will be admitted to the hospitalist service for further evaluation and treatment.      * No surgery found *      Hospital Course:   Patient is a 33-year-old gentleman who was admitted with symptomatic ureterolithiasis.  His symptoms have now resolved with conservative management and IV fluids.  He is feeling much better.  Additionally, his lipase was elevated suggestive of pancreatitis however he has no abdominal pain.  CT imaging without evidence of pancreatitis.  He is now improving and tolerating a regular diet.  I have recommended that he follow up with primary care within 1-2 weeks.  He says he will try to establish with primary care and follow-up for routine healthcare maintenance and hospital follow-up.  I reviewed the discharge plan of care and instructions with the patient on the day of discharge including return precautions.  I have evaluated him and he is safe to return home with plans for outpatient follow-up with PCP.       Goals of Care Treatment Preferences:  Code Status: Full Code      Consults:   Consults (From admission, onward)        Status Ordering Provider     Inpatient consult to Registered Dietitian/Nutritionist  Once        Provider:  (Not yet assigned)    Acknowledged COLT SEWELL          No new Assessment & Plan  notes have been filed under this hospital service since the last note was generated.  Service: Hospital Medicine    Final Active Diagnoses:    Diagnosis Date Noted POA    PRINCIPAL PROBLEM:  Acute pancreatitis [K85.90] 06/21/2023 Yes    Kidney stone [N20.0] 06/21/2023 Yes    Hydrocele in adult [N43.3] 06/21/2023 Yes      Problems Resolved During this Admission:       Discharged Condition: good    Disposition: Home or Self Care    Follow Up:   Follow-up Information     Primary Doctor No. Schedule an appointment as soon as possible for a visit.    Why: Make an appointment with the primary care in 1-2 weeks.                     Patient Instructions:      Ambulatory referral/consult to Family Practice   Standing Status: Future   Referral Priority: Routine Referral Type: Consultation   Referral Reason: Specialty Services Required   Requested Specialty: Family Medicine   Number of Visits Requested: 1     Diet Adult Regular     No dressing needed     Activity as tolerated       Significant Diagnostic Studies: Labs:   BMP:   Recent Labs   Lab 06/21/23  1446 06/22/23  0414   GLU 95 97    139   K 3.7 3.6    109   CO2 25 25   BUN 10 10   CREATININE 0.9 0.9   CALCIUM 9.2 8.6*   MG 1.8  --    , CBC   Recent Labs   Lab 06/21/23  1446 06/22/23  0414   WBC 6.99 5.33   HGB 14.3 13.2*   HCT 43.7 41.6    173    and All labs within the past 24 hours have been reviewed    Pending Diagnostic Studies:     None         Medications:  Reconciled Home Medications:      Medication List      You have not been prescribed any medications.         Indwelling Lines/Drains at time of discharge:   Lines/Drains/Airways     None                 Time spent on the discharge of patient: 50 minutes         Edward Martini MD  Department of Hospital Medicine  [facility]

## 2023-06-22 NOTE — SUBJECTIVE & OBJECTIVE
No past medical history on file.    No past surgical history on file.    Review of patient's allergies indicates:  No Known Allergies    No current facility-administered medications on file prior to encounter.     No current outpatient medications on file prior to encounter.     Family History    None       Tobacco Use    Smoking status: Never    Smokeless tobacco: Never   Substance and Sexual Activity    Alcohol use: Yes    Drug use: No    Sexual activity: Not on file     Review of Systems   All other systems reviewed and are negative.  Twelve point review of systems obtained and negative except as stated above in HPI    Objective:     Vital Signs (Most Recent):  Temp: 98 °F (36.7 °C) (06/21/23 1327)  Pulse: 63 (06/21/23 2102)  Resp: 18 (06/21/23 2108)  BP: 120/77 (06/21/23 2102)  SpO2: 96 % (06/21/23 2107) Vital Signs (24h Range):  Temp:  [98 °F (36.7 °C)] 98 °F (36.7 °C)  Pulse:  [49-83] 63  Resp:  [16-18] 18  SpO2:  [95 %-98 %] 96 %  BP: (110-140)/(58-83) 120/77     Weight: 77.1 kg (170 lb)  Body mass index is 26.63 kg/m².     Physical Exam  Vitals and nursing note reviewed.   Constitutional:       General: He is awake. He is not in acute distress.     Appearance: Normal appearance. He is not ill-appearing.      Comments: Healthy-appearing adult male, he is in no acute distress, good historian, his significant other is at bedside.   HENT:      Head: Normocephalic.      Right Ear: Hearing normal.      Left Ear: Hearing normal.      Nose: Nose normal.      Mouth/Throat:      Lips: Pink.      Mouth: Mucous membranes are moist.      Pharynx: Oropharynx is clear.   Eyes:      General: Lids are normal. Vision grossly intact.   Cardiovascular:      Rate and Rhythm: Normal rate and regular rhythm.      Pulses: Normal pulses.           Radial pulses are 2+ on the right side and 2+ on the left side.        Dorsalis pedis pulses are 2+ on the right side and 2+ on the left side.      Heart sounds: Normal heart sounds, S1  normal and S2 normal. No murmur heard.  Pulmonary:      Effort: Pulmonary effort is normal. No tachypnea or respiratory distress.      Breath sounds: Normal breath sounds. No decreased breath sounds, wheezing, rhonchi or rales.   Chest:      Chest wall: No tenderness.   Abdominal:      General: Abdomen is flat. Bowel sounds are normal. There is no distension.      Palpations: Abdomen is soft.      Tenderness: There is abdominal tenderness.      Comments: Mild right upper quadrant tenderness.  Otherwise abdomen is tender with bowel sounds.   Genitourinary:     Comments: Able to void clear yellow urine.  No CVA tenderness bilaterally.  Musculoskeletal:         General: No swelling or deformity. Normal range of motion.      Cervical back: Normal range of motion.      Right lower leg: No edema.      Left lower leg: No edema.      Comments: Independently ambulatory, moves all extremities with good equal strength.   Skin:     General: Skin is warm and dry.      Capillary Refill: Capillary refill takes less than 2 seconds.   Neurological:      General: No focal deficit present.      Mental Status: He is alert and oriented to person, place, and time.      GCS: GCS eye subscore is 4. GCS verbal subscore is 5. GCS motor subscore is 6.      Sensory: Sensation is intact.      Motor: Motor function is intact.      Coordination: Coordination is intact.   Psychiatric:         Attention and Perception: Attention and perception normal.         Mood and Affect: Mood and affect normal.         Speech: Speech normal.         Behavior: Behavior normal. Behavior is cooperative.         Thought Content: Thought content normal.         Cognition and Memory: Cognition and memory normal.         Judgment: Judgment normal.              Significant Labs: All pertinent labs within the past 24 hours have been reviewed.    Bilirubin:   Recent Labs   Lab 06/21/23  1446   BILITOT 0.7     CBC:   Recent Labs   Lab 06/21/23  1446   WBC 6.99   HGB 14.3    HCT 43.7        CMP:   Recent Labs   Lab 06/21/23  1446      K 3.7      CO2 25   GLU 95   BUN 10   CREATININE 0.9   CALCIUM 9.2   PROT 7.7   ALBUMIN 4.4   BILITOT 0.7   ALKPHOS 60   AST 23   ALT 24   ANIONGAP 7*     Magnesium:   Recent Labs   Lab 06/21/23  1446   MG 1.8     TSH:   Recent Labs   Lab 06/21/23  1446   TSH 0.840     Urine Studies:   Recent Labs   Lab 06/21/23  1819   COLORU Yellow   APPEARANCEUA Clear   PHUR 7.0   SPECGRAV 1.015   PROTEINUA Negative   GLUCUA Negative   KETONESU Negative   BILIRUBINUA Negative   OCCULTUA 3+*   NITRITE Negative   UROBILINOGEN Negative   LEUKOCYTESUR Negative   RBCUA 16*   WBCUA 0   BACTERIA Negative   SQUAMEPITHEL 1   HYALINECASTS 1       Significant Imaging: I have reviewed all pertinent imaging results/findings within the past 24 hours.    US Scrotum And Testicles [[id number]]Collected: 06/21/23 1334     FINDINGS:     Right testicle measures 3.7 x 1.9 x 2.9 cm. Left testicle measures 3.6 x 2.1 x 2.8 cm. Normal vascularity of the testicles demonstrated bilaterally. No testicular mass or lesion identified. There are small bilateral hydroceles noted, more so on the left and the right. Left epididymal head cyst versus spermatocele noted measuring 6 mm. Right epididymis is normal. No inguinal hernia identified.     IMPRESSION:     Small bilateral hydroceles, left greater than right.   6 mm left epididymal head cyst.     CT Renal Stone Study ABD Pelvis WO [[id number]]Collected: 06/21/23 1417     FINDINGS: Noncontrast axial images were obtained. Nonenhanced study is tailored for the detection of urolithiasis, and is insensitive for abnormalities of the solid organs, vasculature and hollow viscera.     CT ABDOMEN: The lung bases are clear.     The liver, spleen, pancreas have a normal noncontrast appearance.   Gallbladder and adrenal glands are normal   The kidneys are symmetric in size. There is mild right hydroureteronephrosis secondary to a faint 2 mm  right ureterovesical junction stone.   The left kidney is normal in size without hydronephrosis. There are no renal stones.     There are no thick-walled or dilated bowel loops.   There is no adenopathy. The aorta is normal in caliber. The musculature is normal.       CT PELVIS: The bladder and prostate gland are normal. There is no pelvic adenopathy. There are no acute osseous abnormalities.     IMPRESSION: Faint 2 mm right ureterovesical junction stone resulting in mild dilatation of the right ureter and collecting system

## 2023-06-22 NOTE — HOSPITAL COURSE
Patient is a 33-year-old gentleman who was admitted with symptomatic ureterolithiasis.  His symptoms have now resolved with conservative management and IV fluids.  He is feeling much better.  Additionally, his lipase was elevated suggestive of pancreatitis however he has no abdominal pain.  CT imaging without evidence of pancreatitis.  He is now improving and tolerating a regular diet.  I have recommended that he follow up with primary care within 1-2 weeks.  He says he will try to establish with primary care and follow-up for routine healthcare maintenance and hospital follow-up.  I reviewed the discharge plan of care and instructions with the patient on the day of discharge including return precautions.  I have evaluated him and he is safe to return home with plans for outpatient follow-up with PCP.

## 2023-06-22 NOTE — PLAN OF CARE
[facility]  Initial Discharge Assessment       Primary Care Provider: Primary Doctor No    Admission Diagnosis: Pancreatitis [K85.90]    Admission Date: 6/21/2023  Expected Discharge Date: 6/23/2023    Transition of Care Barriers: None    Initial assessment completed at bedside with patient. SW verified facesheet information. Patient does not have a PCP and would like assistance finding one. SW referred to [facility]. Corrected patient's phone number and added pharmacy of choice. Confirmed patient's address and emergency contact. Patient reports being independent with all ADLS and does not use any DME at home. No changes. He does not take prescription medications therefore, no dialysis or coumadin. Patient plans to discharge home alone and girlfriend will transport. No discharge needs anticipated.     Payor: MEDICAID / Plan: [employer] / Product Type: Managed Medicaid /     Extended Emergency Contact Information  Primary Emergency Contact: Zuleika Tate  Mobile Phone: [phone]  Relation: Significant other  Preferred language: English   needed? No    Discharge Plan A: Home  Discharge Plan B: Home      [id number] [address] AT [address] [address]  Phone: [phone] Fax: [phone]      Initial Assessment (most recent)       Adult Discharge Assessment - 06/22/23 1127          Discharge Assessment    Assessment Type Discharge Planning Assessment     Confirmed/corrected address, phone number and insurance Yes     Confirmed Demographics --   SW updated phone number    Source of Information patient     When was your last doctors appointment? --   2012    Reason For Admission Acute pancreatitis     People in Home alone     Facility Arrived From: Home     Do you expect to return to your current living situation? Yes     Do you have help at home or someone to  help you manage your care at home? No     Prior to hospitilization cognitive status: Alert/Oriented;No Deficits     Current cognitive status: No Deficits;Alert/Oriented     Walking or Climbing Stairs --   Independent with ADLs    Dressing/Bathing --   Independent with ADLs    Do you have any problems with: --   N/A    Equipment Currently Used at Home none     Readmission within 30 days? No     Patient currently being followed by outpatient case management? No     Do you currently have service(s) that help you manage your care at home? No     Do you take prescription medications? No     Do you have prescription coverage? Yes     Coverage [employer]     Do you have any problems affording any of your prescribed medications? --   N/A    Is the patient taking medications as prescribed? --   N/A    Who is going to help you get home at discharge? Girlfriend Zuleika     How do you get to doctors appointments? car, drives self     Are you on dialysis? No     Do you take coumadin? No     Discharge Plan A Home     Discharge Plan B Home     DME Needed Upon Discharge  none     Discharge Plan discussed with: Patient     Transition of Care Barriers None

## 2023-06-22 NOTE — NURSING
Nurses Note -- 4 Eyes      6/22/2023   2:22 AM      Skin assessed during: Admit      [x] No Altered Skin Integrity Present    [x]Prevention Measures Documented      [] Yes- Altered Skin Integrity Present or Discovered   [] LDA Added if Not in Epic (Describe Wound)   [] New Altered Skin Integrity was Present on Admit and Documented in LDA   [] Wound Image Taken    Wound Care Consulted? No    Attending Nurse:  LORENA ORTIZ RN     Second RN/Staff Member:stevan Subramanian RN

## 2023-06-23 ENCOUNTER — PATIENT OUTREACH (OUTPATIENT)
Dept: ADMINISTRATIVE | Facility: CLINIC | Age: 33
End: 2023-06-23

## 2023-06-23 NOTE — PROGRESS NOTES
C3 nurse spoke with Roberto Rosales for a TCC post hospital discharge follow up call. The patient does not have a scheduled HOSFU appointment, as he does not have a PCP at this time. A referral was made, but the patient has not heard from their office as of now. Call dropped during conversation. Tried to callback, and no answer and no voicemail available to leave a message.    Detail Level: Generalized General Sunscreen Counseling: I recommended a broad spectrum sunscreen with a SPF of 30 or higher.  I explained that SPF 30 sunscreens block approximately 97 percent of the sun's harmful rays.  Sunscreens should be applied at least 15 minutes prior to expected sun exposure and then every 2 hours after that as long as sun exposure continues. If swimming or exercising sunscreen should be reapplied every 45 minutes to an hour after getting wet or sweating.  I also recommended a lip balm with a sunscreen as well. Sun protective clothing can be used in lieu of sunscreen but must be worn the entire time you are exposed to the sun's rays.

## 2023-06-26 NOTE — PROGRESS NOTES
C3 nurse spoke with Roberto Rosales for a TCC post hospital discharge follow up call. The patient does not have a scheduled HOSFU appointment with 5-7 days of discharge, as he does not have a PCP. He was referred to PCP upon discharge, but has not received a call regarding an appointment. Message sent to patient's discharge  to see if she could be of assistance in checking on the status of this referral for the patient.